# Patient Record
Sex: FEMALE
[De-identification: names, ages, dates, MRNs, and addresses within clinical notes are randomized per-mention and may not be internally consistent; named-entity substitution may affect disease eponyms.]

---

## 2017-03-16 ENCOUNTER — HOSPITAL ENCOUNTER (OUTPATIENT)
Dept: HOSPITAL 75 - RAD | Age: 52
End: 2017-03-16
Attending: NURSE PRACTITIONER
Payer: COMMERCIAL

## 2017-03-16 DIAGNOSIS — R42: Primary | ICD-10-CM

## 2017-03-16 PROCEDURE — 70551 MRI BRAIN STEM W/O DYE: CPT

## 2017-03-16 NOTE — XMS REPORT
Continuity of Care Document

 Created on: 2015



RENAN LE

External Reference #: [id number]

: 1965

Sex: Female



Demographics







 Address  [address] [place]  [address]

 

 Home Phone  [phone]

 

 Preferred Language  English

 

 Marital Status  Unknown

 

 Worship Affiliation  Unknown

 

 Race  Unknown

 

 Ethnic Group  Unknown





Author







 Author  [facility]

 

 Organization  [facility]

 

 Address  Unknown

 

 Phone  Unavailable







Support







 Name  Relationship  Address  Phone

 

 KONSTANTIN JORGENSEN DO  Caregiver  [address] [place]  [address] [phone]

 

 DADA LE  Next Of Kin  [address] [place]  [address] [phone]







Insurance Providers







 Payer Name  Policy Number  Subscriber Name  Relationship

 

 Self Pay     Renan Le  18 Self / Same As Patient







Advance Directives







 Directive  Response  Recorded Date/Time

 

 Advance Directives  No  03/12/15 8:06am

 

 Health Care Power of   No  03/12/15 8:06am

 

 Organ Donor  Yes  03/12/15 8:06am

 

 Resuscitation Status  Full Code  03/12/15 8:06am







Problems

No known problems or medical conditions.



Medications







 Medication  Dose  Route  Sig  Days/Qty  Instructions  Order Date  Discontinued 
Date  Status

 

 Docusate Sodium  100 Mg  PO  TWICE A DAY PRN CONSTIPATION  40 Qty     03/13/15
     Active

 

 Acetaminophen/Hydrocodone Bitart (Norco)  1-2 Ea  PO  EVERY 6 HOURS PRN PAIN  
50 Qty     03/13/15     Active

 

 Ibuprofen  600 Mg  PO  EVERY 6 HOURS PRN PAIN  60 Qty     03/13/15     Active

 

 [Simethicone]  40 Mg  PO  THREE TIMES A DAY PRN INDIGESTION  40 Qty     03/13/
15     Active

 

 Magnesium Hydroxide  400 Mg  PO  TWICE A DAY PRN CONSTIPATION  30 Days     03/
13/15     Active







Social History







 Social History Problem  Response  Recorded Date/Time

 

 Recent Foreign Travel  No  2015 8:06am

 

 Smoking Status  Current Everyday Smoker  2015 8:07am

 

 Do you dip or chew tobacco?  No  2015 8:07am









 Query  Response  Start Date  Stop Date

 

 Smoking Status  Current Everyday Smoker      







Hospital Discharge Instructions

No hospital discharge instructions.



Plan of Care

No plan of care.



Functional Status

No functional status results.



Allergies, Adverse Reactions, Alerts







 Allergen  Type  Severity  Reaction  Status  Last Updated

 

 morphine  Allergy  Unknown  ANAPHYLAXIS  Active  03/12/15







Immunizations







 Name  Given  Type

 

 Date of Influenza Vaccine  14  Historical







Vital Signs

Acute Vital Signs





 Vital  Response  Date/Time

 

 Temperature (Fahrenheit)  97.4 degrees F (97.6 - 99.5)   

 

 Temperature (Calculated Celsius)  36.70506 degrees C (36.4 - 37.5)   

 

 Temperature Source  Tympanic     

 

 Pulse Rate (adult)  74 bpm (60 - 90)   

 

 Respiratory Rate  18 bpm (12 - 24)   

 

 O2 Sat by Pulse Oximetry  100 % (88 - 100)   

 

 Blood Pressure  103/60 mm Hg   

 

 Pain      

 

    Pain Intensity  3     

 

 Pain      

 

    Pain Intensity  3     

 

 Height (Feet)  5 feet    

 

 Height (Inches)  2.00 inches    

 

 Height (Calculated Centimeters)  157.273276 cm    

 

 Weight (Pounds)  134 pounds    

 

 Weight (Calculated Grams)  77311.378 gm    

 

 Weight (Calculated Kilograms)  60.417169 kilograms    

 

 Height  5 ft 2 in    

 

 Weight  134 lb    

 

 Body Mass Index  24.5 kg/m^2    







Results

Laboratory Results







 Test Name  Result  Units  Flags  Reference  Collection Date/Time  Result Date/
Time  Comments

 

 White Blood Count  9.5  10^3/uL     4.3-11.0  2015 10:02am  2015 10
:14am   

 

 Red Blood Count  5.16  10^6/uL     4.35-5.85  2015 10:02am  2015 10
:14am   

 

 Hemoglobin  15.2  G/DL     11.5-16.0  2015 10:02am  2015 10:14am   

 

 Hematocrit  45  %     35-52  2015 10:02am  2015 10:14am   

 

 Mean Corpuscular Volume  86  FL     80-99  2015 10:02am  2015 10:
14am   

 

 Mean Corpuscular Hemoglobin  30  PG     25-34  2015 10:02am  2015 
10:14am   

 

 Mean Corpuscular Hemoglobin Concent  34  G/DL     32-36  2015 10:02am   10:14am   

 

 Red Cell Distribution Width  13.2  %     10.0-14.5  2015 10:02am  2015 10:14am   

 

 Platelet Count  210  10^3/uL     130-400  2015 10:02am  2015 10:
14am   

 

 Mean Platelet Volume  9.0  FL     7.4-10.4  2015 10:02am  2015 10:
14am   

 

 Neutrophils (%) (Auto)  64  %     42-75  2015 10:02am  2015 10:
14am   

 

 Lymphocytes (%) (Auto)  30  %     12-44  2015 10:02am  2015 10:
14am   

 

 Monocytes (%) (Auto)  5  %     0-12  2015 10:02am  2015 10:14am   

 

 Eosinophils (%) (Auto)  1  %     0-10  2015 10:02am  2015 10:14am 
  

 

 Basophils (%) (Auto)  0  %     0-10  2015 10:02am  2015 10:14am   

 

 Neutrophils # (Auto)  6.1  X 10^3     1.8-7.8  2015 10:02am  2015 
10:14am   

 

 Lymphocytes # (Auto)  2.8  X 10^3     1.0-4.0  2015 10:02am  2015 
10:14am   

 

 Monocytes # (Auto)  0.4  X 10^3     0.0-1.0  2015 10:02am  2015 10:
14am   

 

 Eosinophils # (Auto)  0.1  10^3/uL     0.0-0.3  2015 10:02am  2015 
10:14am   

 

 Basophils # (Auto)  0.0  10^3/uL     0.0-0.1  2015 10:02am  2015 10
:14am   







Procedures







 Procedure  Status  Date  Provider(s)

 

 Robot-assisted hysterectomy with bilateral salpingo-oophorectomy  completed  03
/12/15  KONSTANTIN JORGENSEN DO







Encounters







 Encounter  Location  Date/Time

 

 Departed Surgical Day Care  [facility]  03/12/15 6:39am

 

 Registered Clinic  [facility]  03/06/15 8:51am

 

 Registered Clinic  [facility]  02/27/15 12:48pm

## 2017-03-16 NOTE — DIAGNOSTIC IMAGING REPORT
PROCEDURE: MR imaging of the brain without contrast.



TECHNIQUE: Multiplanar, multisequence MR imaging of the brain

was performed without contrast.



INDICATION: Dizziness and headaches for five months.



FINDINGS: Diffusion sequence demonstrates no diffusion

restriction to suggest an acute infarct or other diffusion

abnormality within the brain or intracranially. There is

diffusion restriction however seen in the right maxillary sinus.

Other sequences demonstrate full obliteration of the right

maxillary sinus with a T2 hyperintense, T1 hypointense lesion

that is not associated with expansion of the maxillary sinus and

is limited to the sinus cavity as well. It appears to be even

associated with slight decrease in the size of the sinus on the

right side which can be associated with chronic sinusitis. No

aggressive features by MRI are seen. This study is without

contrast and is not evaluated for possible enhancing components.



The brain demonstrates minimal T2 hyperintense signal lesions

seen in the deep and subcortical white matter, most prominent in

the left frontoparietal region measuring 5 mm likely related to

early manifestation of chronic microvascular ischemic changes.

There is no significant demyelination plaque, brain edema, or

mass effect seen. There is no hydrocephalus. The central

vascular flow voids appear grossly unremarkable. The internal

auditory canals and inner ear structures appear symmetric.



The pituitary gland is normal in size. No hypothalamic or pineal

region mass.



IMPRESSION:

1. No significant intracranial abnormality.

2. 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

with associated slight decrease in the size of the sinus cavity

itself with no definitive aggressive features based on this

unenhanced study, in favor of chronic sinusitis or less likely

antrochoanal polyp. Correlate clinically and with dedicated CT

scan of the sinuses as needed.



Dictated by:



Dictated on workstation # [id number]

## 2019-03-08 ENCOUNTER — HOSPITAL ENCOUNTER (OUTPATIENT)
Dept: HOSPITAL 75 - RAD FS | Age: 54
End: 2019-03-08
Attending: NURSE PRACTITIONER
Payer: SELF-PAY

## 2019-03-08 DIAGNOSIS — R42: Primary | ICD-10-CM

## 2019-03-08 PROCEDURE — 70450 CT HEAD/BRAIN W/O DYE: CPT

## 2019-03-08 NOTE — DIAGNOSTIC IMAGING REPORT
CLINICAL INDICATION: Patient states that she has been getting

dizzy the past two years and getting worse. Patient had

concussion 4 years ago.



EXAM: Axial CT scan of the brain performed without IV contrast.



COMPARISON: MRI of the brain without IV contrast dated

03/16/2017.



FINDINGS:



There is no evidence of acute cerebral infarct, intracranial

hemorrhage, or gross mass effect. 

The previously seen 5 mm area of high T2 signal in the lateral

left frontal lobe subcortical region is not visualized on this

exam and may be due to to differences in imaging modality.



The brain parenchymal volume appears appropriate for patient's

age. There is normal gray-white matter distinction.  There is no

significant midline shift or herniation. 



 There is no evidence of hydrocephalus. The basal cisterns are

unremarkable. The skull, extracranial soft tissue, and orbits are

unremarkable. The visualized portions of the paranasal sinuses

are unremarkable. The right maxillary sinus is not imaged to

evaluate for resolution of the previously seen consolidated right

maxillary sinus. Temporal bones show no significant abnormality.



IMPRESSION:



1:  Unremarkable CT scan of the brain for age.



2: Of note, the right maxillary sinus was not imaged on this exam

to evaluate for interval change of the previously seen

consolidated right maxillary sinus.



Dictated by: 



  Dictated on workstation # [id number]

## 2019-09-24 ENCOUNTER — HOSPITAL ENCOUNTER (OUTPATIENT)
Dept: HOSPITAL 75 - RAD FS | Age: 54
End: 2019-09-24
Attending: NURSE PRACTITIONER
Payer: SELF-PAY
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
## 2019-09-24 DIAGNOSIS — M25.552: Primary | ICD-10-CM

## 2019-09-24 PROCEDURE — 73502 X-RAY EXAM HIP UNI 2-3 VIEWS: CPT

## 2019-09-24 NOTE — DIAGNOSTIC IMAGING REPORT
INDICATION: Left hip pain. Fall.



COMPARISON: None.



FINDINGS: Two views of the left hip were obtained and show no

fractures, dislocations, or other acute bony abnormalities. Joint

spaces are well maintained throughout. The soft tissues appear

unremarkable. No radiopaque foreign bodies are identified.



IMPRESSION: No acute fracture or dislocation of the left hip.



Dictated by: 



  Dictated on workstation # [id number]